# Patient Record
Sex: FEMALE | Race: WHITE | NOT HISPANIC OR LATINO | Employment: OTHER | URBAN - METROPOLITAN AREA
[De-identification: names, ages, dates, MRNs, and addresses within clinical notes are randomized per-mention and may not be internally consistent; named-entity substitution may affect disease eponyms.]

---

## 2019-10-15 ENCOUNTER — APPOINTMENT (OUTPATIENT)
Dept: GENERAL RADIOLOGY | Facility: HOSPITAL | Age: 24
End: 2019-10-15

## 2019-10-15 ENCOUNTER — HOSPITAL ENCOUNTER (EMERGENCY)
Facility: HOSPITAL | Age: 24
Discharge: HOME | End: 2019-10-15
Admitting: EMERGENCY MEDICINE

## 2019-10-15 VITALS
HEART RATE: 96 BPM | HEIGHT: 63 IN | OXYGEN SATURATION: 98 % | SYSTOLIC BLOOD PRESSURE: 114 MMHG | TEMPERATURE: 98.4 F | RESPIRATION RATE: 16 BRPM | DIASTOLIC BLOOD PRESSURE: 68 MMHG | WEIGHT: 225.97 LBS | BODY MASS INDEX: 40.04 KG/M2

## 2019-10-15 DIAGNOSIS — M25.561 ACUTE PAIN OF RIGHT KNEE: Primary | ICD-10-CM

## 2019-10-15 PROCEDURE — 25010000002 KETOROLAC TROMETHAMINE PER 15 MG: Performed by: PHYSICIAN ASSISTANT

## 2019-10-15 PROCEDURE — 96372 THER/PROPH/DIAG INJ SC/IM: CPT

## 2019-10-15 PROCEDURE — 99283 EMERGENCY DEPT VISIT LOW MDM: CPT

## 2019-10-15 PROCEDURE — 73562 X-RAY EXAM OF KNEE 3: CPT

## 2019-10-15 RX ORDER — CITALOPRAM 40 MG/1
40 TABLET ORAL DAILY
COMMUNITY
End: 2020-03-18

## 2019-10-15 RX ORDER — BUSPIRONE HYDROCHLORIDE 10 MG/1
10 TABLET ORAL 3 TIMES DAILY PRN
COMMUNITY
End: 2020-03-18

## 2019-10-15 RX ORDER — BUPROPION HYDROCHLORIDE 150 MG/1
150 TABLET ORAL DAILY
COMMUNITY
End: 2020-03-18

## 2019-10-15 RX ORDER — KETOROLAC TROMETHAMINE 30 MG/ML
30 INJECTION, SOLUTION INTRAMUSCULAR; INTRAVENOUS ONCE
Status: COMPLETED | OUTPATIENT
Start: 2019-10-15 | End: 2019-10-15

## 2019-10-15 RX ORDER — NAPROXEN 500 MG/1
500 TABLET ORAL 2 TIMES DAILY WITH MEALS
Qty: 20 TABLET | Refills: 0 | Status: SHIPPED | OUTPATIENT
Start: 2019-10-15 | End: 2020-03-18

## 2019-10-15 RX ADMIN — KETOROLAC TROMETHAMINE 30 MG: 30 INJECTION, SOLUTION INTRAMUSCULAR at 11:24

## 2019-10-15 NOTE — ED PROVIDER NOTES
Subjective   History of Present Illness  Patient is a 24-year-old female presents with right knee pain for the past 2 days.  Patient states she first noticed it 2 days ago when she was getting out of her car.  Denies any injury to the area that she is aware of.  Describes it as an intermittent sharp type pain along the anterior and medial aspect of her knee that is worse with movement better with rest.  Currently rates 8/10 severity.  States it does radiate down her leg at times states she is tried applying ice, elevating, ibuprofen with minimal relief of her symptoms.  Denies any past surgeries to her right knee.  Patient states she did play softball for several years and has had chronic knee pain in the past.  Has any erythema, edema, ecchymosis, paresthesia, weakness.  Denies any fever, chest pain, shortness of breath.  Eating or exacerbating factors  \   Review of Systems   Constitutional: Negative.    Respiratory: Negative.    Cardiovascular: Negative.    Gastrointestinal: Negative for abdominal pain, nausea and vomiting.   Musculoskeletal: Positive for arthralgias and gait problem.   Skin: Negative for color change, rash and wound.   Neurological: Negative for dizziness, seizures, syncope, weakness, light-headedness and headaches.       Past Medical History:   Diagnosis Date   • Depression        Allergies   Allergen Reactions   • Codeine Hives   • Penicillins Hives       Past Surgical History:   Procedure Laterality Date   • ADENOIDECTOMY     • CHOLECYSTECTOMY     • TONSILLECTOMY         History reviewed. No pertinent family history.    Social History     Socioeconomic History   • Marital status:      Spouse name: Not on file   • Number of children: Not on file   • Years of education: Not on file   • Highest education level: Not on file   Tobacco Use   • Smoking status: Never Smoker   • Smokeless tobacco: Never Used   Substance and Sexual Activity   • Alcohol use: Yes     Frequency: Never     Comment:  "socaially    • Drug use: No   • Sexual activity: Defer           Objective   Physical Exam   Nursing note and vitals reviewed.  The patient is well developed, well nourished, alert, cooperative and in no acute distress.    HEENT exam is normocephalic atraumatic. Pupils are equal round and reactive to light. EOMI.   Mucous membranes are moist.     Lungs are clear to auscultation bilaterally chest wall expansion equal without retraction    Cardiovascular:  Regular rate and rhythm without murmur there is no peripheral edema, cyanosis or pallor.  Extremities are warm and well perfused.  Capillary refill less than 2 second    Extremities:  There is no significant deformity or joint abnormality.  Tenderness to palpation along the anterior aspect of the right knee no overlying erythema, edema, warmth noted.  Normal range of motion with flexion and extension of right knee minimal pain is noted.  Negative anterior posterior drawer test.  Normal joint laxity with varus valgus stress.  Peripheral pulses are intact. Compartments soft.  Negative Homans sign    Neurologic:  Alert and oriented without focal neurological deficits.     Skin shows no rash, petechiae, or purpura.      Procedures           ED Course    /68 (BP Location: Right leg, Patient Position: Sitting)   Pulse 96   Temp 98.4 °F (36.9 °C) (Oral)   Resp 16   Ht 160 cm (63\")   Wt 102 kg (225 lb 15.5 oz)   LMP 10/08/2019 (Exact Date)   SpO2 98%   Breastfeeding? No   BMI 40.03 kg/m²   Medications   ketorolac (TORADOL) injection 30 mg (30 mg Intramuscular Given 10/15/19 1124)     Xr Knee 3 View Right    Result Date: 10/15/2019  Normal left knee series.  Electronically Signed By-Dr. Meggan Villasenor MD On:10/15/2019 11:56 AM This report was finalized on [id number] by Dr. Meggan Villasenor MD.                  MDM  Number of Diagnoses or Management Options  Acute pain of right knee:   Diagnosis management comments: Chart Review:  Comorbidity: " None  Differentials: Facture, dislocation, sprain/strain, septic arthritis, joint effusion      ;this list is not all inclusive and does not constitute the entirety of considered causes  Imaging: Was interpreted by physician and reviewed by myself:  Xr Knee 3 View Right  Result Date: 10/15/2019  Normal left knee series.  Electronically Signed By-Dr. Meggan Villasenor MD On:10/15/2019 11:56 AM This report was finalized on [id number] by Dr. Meggan Villasenor MD.    Disposition/Treatment:  In the ED patient was given Toradol.  There are no signs of secondary infection of the right knee.  X-ray showed no acute osseous abnormalities.  After reassessment patient states her pain is improved.   findings were discussed with the patient  who voiced understanding of discharge instructions along with signs and symptoms requiring return to the ED.  Upon discharged patient was in stable condition with followup for a revaluation.  Patient was given knee sleeve and crutches for home.  Patient was given naproxen for home.  Patient was also given a work note she was advised to follow-up with orthopedics if pain continues states she does have a follow-up with primary care this Friday       Amount and/or Complexity of Data Reviewed  Tests in the radiology section of CPT®: reviewed        Final diagnoses:   Acute pain of right knee              Tee Maynard PA  10/15/19 1252

## 2019-10-15 NOTE — DISCHARGE INSTRUCTIONS
Use knee sleeve to the right knee for the next 5-7 days; perform range of motion exercises 3-4 times daily as instructed; apply ice for 20 minutes at a time for the next 48 hours to reduce swelling; use crutches for non weight-bearing to the right k nee for the next 3-5 days, gradually begin weight bearing as tolerated by pain. Return for worsening symptoms including increased pain, swelling, discoloration, or coldness of a extremity.    Use naproxen as needed for pain.    Follow-up with your primary care provider in 3-5 days.  If you do not have a primary care provider call [phone] [facility]OURCE for help in finding one, or you may follow up with [facility] at [phone].    Follow-up with Dr. Gallo's office as listed below if your pain continues
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
## 2020-03-18 ENCOUNTER — HOSPITAL ENCOUNTER (EMERGENCY)
Facility: HOSPITAL | Age: 25
Discharge: HOME | End: 2020-03-18
Admitting: EMERGENCY MEDICINE

## 2020-03-18 ENCOUNTER — APPOINTMENT (OUTPATIENT)
Dept: GENERAL RADIOLOGY | Facility: HOSPITAL | Age: 25
End: 2020-03-18

## 2020-03-18 ENCOUNTER — APPOINTMENT (OUTPATIENT)
Dept: CT IMAGING | Facility: HOSPITAL | Age: 25
End: 2020-03-18

## 2020-03-18 VITALS
DIASTOLIC BLOOD PRESSURE: 87 MMHG | HEART RATE: 91 BPM | WEIGHT: 234.35 LBS | SYSTOLIC BLOOD PRESSURE: 122 MMHG | RESPIRATION RATE: 16 BRPM | TEMPERATURE: 98.3 F | BODY MASS INDEX: 41.52 KG/M2 | OXYGEN SATURATION: 98 % | HEIGHT: 63 IN

## 2020-03-18 DIAGNOSIS — M54.2 NECK PAIN: ICD-10-CM
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
## 2020-03-18 DIAGNOSIS — R10.9 ABDOMINAL PAIN, UNSPECIFIED ABDOMINAL LOCATION: ICD-10-CM

## 2020-03-18 DIAGNOSIS — M43.6 NECK STIFFNESS: ICD-10-CM

## 2020-03-18 LAB — B-HCG UR QL: NEGATIVE

## 2020-03-18 PROCEDURE — 74177 CT ABD & PELVIS W/CONTRAST: CPT

## 2020-03-18 PROCEDURE — 81025 URINE PREGNANCY TEST: CPT | Performed by: PHYSICIAN ASSISTANT

## 2020-03-18 PROCEDURE — 72050 X-RAY EXAM NECK SPINE 4/5VWS: CPT

## 2020-03-18 PROCEDURE — 99283 EMERGENCY DEPT VISIT LOW MDM: CPT

## 2020-03-18 PROCEDURE — 0 IOPAMIDOL PER 1 ML: Performed by: PHYSICIAN ASSISTANT

## 2020-03-18 RX ORDER — METHOCARBAMOL 500 MG/1
500 TABLET, FILM COATED ORAL ONCE
Status: COMPLETED | OUTPATIENT
Start: 2020-03-18 | End: 2020-03-18

## 2020-03-18 RX ORDER — METHOCARBAMOL 500 MG/1
500 TABLET, FILM COATED ORAL 4 TIMES DAILY
Qty: 16 TABLET | Refills: 0 | Status: SHIPPED | OUTPATIENT
Start: 2020-03-18

## 2020-03-18 RX ORDER — SODIUM CHLORIDE 0.9 % (FLUSH) 0.9 %
10 SYRINGE (ML) INJECTION AS NEEDED
Status: DISCONTINUED | OUTPATIENT
Start: 2020-03-18 | End: 2020-03-18 | Stop reason: HOSPADM

## 2020-03-18 RX ORDER — NAPROXEN 500 MG/1
500 TABLET ORAL 2 TIMES DAILY WITH MEALS
Qty: 20 TABLET | Refills: 0 | Status: SHIPPED | OUTPATIENT
Start: 2020-03-18

## 2020-03-18 RX ORDER — LIDOCAINE 50 MG/G
1 PATCH TOPICAL EVERY 24 HOURS
Qty: 6 EACH | Refills: 0 | Status: SHIPPED | OUTPATIENT
Start: 2020-03-18

## 2020-03-18 RX ORDER — LIDOCAINE 50 MG/G
1 PATCH TOPICAL ONCE
Status: DISCONTINUED | OUTPATIENT
Start: 2020-03-18 | End: 2020-03-18 | Stop reason: HOSPADM

## 2020-03-18 RX ADMIN — IOPAMIDOL 100 ML: 755 INJECTION, SOLUTION INTRAVENOUS at 19:47

## 2020-03-18 RX ADMIN — METHOCARBAMOL 500 MG: 500 TABLET ORAL at 18:58

## 2020-03-18 RX ADMIN — SODIUM CHLORIDE 500 ML: 0.9 INJECTION, SOLUTION INTRAVENOUS at 18:57

## 2020-03-18 RX ADMIN — LIDOCAINE 1 PATCH: 50 PATCH TOPICAL at 18:58
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
## 2020-03-19 NOTE — DISCHARGE INSTRUCTIONS
Follow-up with your primary care provider in 3-5 days.  If you do not have a primary care provider call [phone] [facility]OURCE for help in finding one, or you may follow up with [facility] at [phone].    Return to ED for any new or worsening symptoms    Take naproxen Robaxin and use lidocaine patches as needed for pain.  Do not apply heating pad over this patch.